# Patient Record
Sex: MALE | Race: WHITE | Employment: OTHER | URBAN - METROPOLITAN AREA
[De-identification: names, ages, dates, MRNs, and addresses within clinical notes are randomized per-mention and may not be internally consistent; named-entity substitution may affect disease eponyms.]

---

## 2017-05-29 PROBLEM — R17 JAUNDICE: Status: ACTIVE | Noted: 2017-05-29

## 2017-05-29 PROBLEM — R74.8 ABNORMAL LIVER ENZYMES: Status: ACTIVE | Noted: 2017-05-29

## 2017-05-29 PROBLEM — F10.10 ALCOHOL ABUSE: Status: ACTIVE | Noted: 2017-05-29

## 2017-06-14 ENCOUNTER — CLINICAL DOCUMENTATION (OUTPATIENT)
Dept: NEPHROLOGY | Age: 27
End: 2017-06-14

## 2017-06-14 PROBLEM — K76.7 HEPATORENAL FAILURE (HCC): Status: ACTIVE | Noted: 2017-06-14

## 2017-08-02 ENCOUNTER — HOSPITAL ENCOUNTER (OUTPATIENT)
Dept: OTHER | Age: 27
Discharge: HOME | End: 2017-08-03
Attending: PHYSICAL MEDICINE & REHABILITATION | Admitting: PHYSICAL MEDICINE & REHABILITATION

## 2017-08-02 ENCOUNTER — HOSPITAL ENCOUNTER (OUTPATIENT)
Dept: PHYSICAL THERAPY | Age: 27
Discharge: HOME | End: 2017-08-31
Admitting: PHYSICAL MEDICINE & REHABILITATION

## 2017-12-18 PROBLEM — F99 PSYCHOLOGICAL DISORDER: Status: ACTIVE | Noted: 2017-12-18

## 2017-12-19 PROBLEM — K70.30 ALCOHOLIC CIRRHOSIS OF LIVER WITHOUT ASCITES (HCC): Chronic | Status: ACTIVE | Noted: 2017-12-19

## 2017-12-19 PROBLEM — F10.10 ALCOHOL ABUSE: Status: RESOLVED | Noted: 2017-05-29 | Resolved: 2017-12-19

## 2017-12-19 PROBLEM — F41.8 OTHER SPECIFIED ANXIETY DISORDERS: Status: ACTIVE | Noted: 2017-12-19

## 2017-12-19 PROBLEM — K76.7 HEPATORENAL FAILURE (HCC): Status: RESOLVED | Noted: 2017-06-14 | Resolved: 2017-12-19

## 2017-12-19 PROBLEM — F10.21 ALCOHOL USE DISORDER, SEVERE, IN EARLY REMISSION, DEPENDENCE (HCC): Status: ACTIVE | Noted: 2017-12-19

## 2017-12-19 PROBLEM — R17 JAUNDICE: Status: RESOLVED | Noted: 2017-05-29 | Resolved: 2017-12-19

## 2017-12-19 PROBLEM — F99 PSYCHOLOGICAL DISORDER: Status: RESOLVED | Noted: 2017-12-18 | Resolved: 2017-12-19

## 2017-12-19 PROBLEM — N30.01 ACUTE CYSTITIS WITH HEMATURIA: Status: ACTIVE | Noted: 2017-12-19

## 2017-12-19 PROBLEM — F33.3 SEVERE EPISODE OF RECURRENT MAJOR DEPRESSIVE DISORDER, WITH PSYCHOTIC FEATURES (HCC): Status: ACTIVE | Noted: 2017-12-19

## 2017-12-19 PROBLEM — R74.8 ABNORMAL LIVER ENZYMES: Status: RESOLVED | Noted: 2017-05-29 | Resolved: 2017-12-19

## 2018-08-22 ENCOUNTER — HOSPITAL ENCOUNTER (EMERGENCY)
Age: 28
Discharge: HOME | End: 2018-08-23
Attending: EMERGENCY MEDICINE
Payer: MEDICARE

## 2018-08-22 DIAGNOSIS — R56.9 SEIZURE (HCC): Primary | ICD-10-CM

## 2018-08-22 LAB
BASE EXCESS VENOUS: -1 (ref -3–3)
CALCIUM IONIZED: 1.15 MMOL/L (ref 1.12–1.32)
CO2: 25 MMOL/L (ref 21–32)
GFR AFRICAN AMERICAN: >60
GFR NON-AFRICAN AMERICAN: >60
GLUCOSE BLD-MCNC: 71 MG/DL (ref 70–99)
HCO3 VENOUS: 24.2 MMOL/L (ref 23–29)
LACTATE: 6.27 MMOL/L (ref 0.4–2)
O2 SAT, VEN: 40 %
PCO2, VEN: 41 MM HG (ref 40–50)
PERFORMED ON: ABNORMAL
PERFORMED ON: ABNORMAL
PH VENOUS: 7.38 (ref 7.35–7.45)
PO2, VEN: 23 MM HG
POC ANION GAP: 11 (ref 10–20)
POC BUN: 18 MG/DL (ref 7–18)
POC CHLORIDE: 107 MMOL/L (ref 99–110)
POC CREATININE: 1 MG/DL (ref 0.9–1.3)
POC POTASSIUM: 5.3 MMOL/L (ref 3.5–5.1)
POC SAMPLE TYPE: ABNORMAL
POC SAMPLE TYPE: ABNORMAL
POC SODIUM: 143 MMOL/L (ref 136–145)
TCO2 CALC VENOUS: 25 MMOL/L

## 2018-08-22 PROCEDURE — 83605 ASSAY OF LACTIC ACID: CPT

## 2018-08-22 PROCEDURE — 80047 BASIC METABLC PNL IONIZED CA: CPT

## 2018-08-22 PROCEDURE — 82803 BLOOD GASES ANY COMBINATION: CPT

## 2018-08-22 PROCEDURE — 99285 EMERGENCY DEPT VISIT HI MDM: CPT

## 2018-08-23 ENCOUNTER — APPOINTMENT (OUTPATIENT)
Dept: CT IMAGING | Age: 28
End: 2018-08-23
Payer: MEDICARE

## 2018-08-23 VITALS
OXYGEN SATURATION: 94 % | TEMPERATURE: 97.8 F | HEART RATE: 65 BPM | DIASTOLIC BLOOD PRESSURE: 98 MMHG | SYSTOLIC BLOOD PRESSURE: 149 MMHG | RESPIRATION RATE: 17 BRPM

## 2018-08-23 PROCEDURE — 93005 ELECTROCARDIOGRAM TRACING: CPT | Performed by: EMERGENCY MEDICINE

## 2018-08-23 PROCEDURE — 70450 CT HEAD/BRAIN W/O DYE: CPT

## 2018-08-23 RX ORDER — ASCORBATE CALCIUM 500 MG
500 TABLET ORAL
COMMUNITY
Start: 2018-04-02 | End: 2018-09-01

## 2018-08-23 RX ORDER — ACETAMINOPHEN 160 MG
1 TABLET,DISINTEGRATING ORAL DAILY
COMMUNITY
Start: 2018-05-01

## 2018-08-23 RX ORDER — FLUOXETINE HYDROCHLORIDE 40 MG/1
40 CAPSULE ORAL
COMMUNITY
Start: 2018-03-27

## 2018-08-23 RX ORDER — NADOLOL 40 MG/1
40 TABLET ORAL DAILY
COMMUNITY
Start: 2018-05-11

## 2018-08-23 ASSESSMENT — ENCOUNTER SYMPTOMS
VOMITING: 0
NAUSEA: 0
SHORTNESS OF BREATH: 0
ABDOMINAL PAIN: 0
DIARRHEA: 0

## 2018-08-23 NOTE — ED TRIAGE NOTES
Pt comes via EMS after a seizure at [address] Knight states he took 9 800mg Gabapentin pills. Pt brother witnessed the seizure.

## 2018-08-23 NOTE — ED PROVIDER NOTES
[address] [facility]          ATTENDING PHYSICIAN NOTE       Date of evaluation: 8/22/2018    Chief Complaint     Seizures      History of Present Illness     Eric Scott is a 25 y.o. male  with a history of Charcot Tarri Blend tooth disease who presents with complaints of a seizure. The patient is chronically on gabapentin for neuropathy pain, taking 800 mg 3 times a day normally. Today he took 9 800 mg tablets around 2 PM.  He began experiencing some twitching not long after and then about 7 hours after taking it developed a generalized tonic-clonic followed by clonic seizure witnessed by family members. The patient was not incontinent but he lost control of his ability to stand up and had to be helped to the ground by a family member. His eyes apparently rolled back into his head and he foamed at the mouth. He has no prior history of seizures. The patient was slow to come around but now has awakened enough to state that he has no complaints at this time. He feels no significant pain other than his baseline neuropathy related pain. He denies any headache. Review of Systems     Review of Systems   Constitutional: Negative for chills and fever. Respiratory: Negative for shortness of breath. Cardiovascular: Negative for chest pain. Gastrointestinal: Negative for abdominal pain, diarrhea, nausea and vomiting. Musculoskeletal: Positive for myalgias. Neurological: Positive for seizures. Negative for weakness, light-headedness and headaches. All other systems reviewed and are negative. Past Medical, Surgical, Family, and Social History     He has a past medical history of Asthma; Charcot-Cheryl-Tooth disease; Liver disease; and Neuropathy. He has a past surgical history that includes Abdomen surgery. His family history is not on file. He reports that he has been smoking Cigarettes. He has a 7.50 pack-year smoking history.  He has never used smokeless tobacco. He reports that he uses drugs, including Marijuana. He reports that he does not drink alcohol. Medications     Discharge Medication List as of 8/23/2018  1:49 AM      CONTINUE these medications which have NOT CHANGED    Details   FLUoxetine (PROZAC) 40 MG capsule Take 40 mg by mouthHistorical Med      Calcium Ascorbate 500 MG TABS Take 500 mg by mouthHistorical Med      !! Cholecalciferol (VITAMIN D3) 2000 units CAPS Take 1 capsule by mouth dailyHistorical Med      nadolol (CORGARD) 40 MG tablet Take 40 mg by mouth dailyHistorical Med      gabapentin (NEURONTIN) 400 MG capsule Take 2 capsules by mouth 3 times dailyHistorical Med      albuterol sulfate  (90 Base) MCG/ACT inhaler Inhale 2 puffs into the lungs every 4 hours as needed for Wheezing, Disp-1 Inhaler, R-3Print      omeprazole (PRILOSEC) 40 MG delayed release capsule Take 1 capsule by mouth daily, Disp-30 capsule, R-3Print      rifaximin (XIFAXAN) 550 MG tablet Take 1 tablet by mouth 2 times daily, Disp-60 tablet, R-1Print      !! vitamin D (CHOLECALCIFEROL) 5000 units CAPS capsule Take 1 capsule by mouth daily, Disp-30 capsule, R-0Print       !! - Potential duplicate medications found. Please discuss with provider. Allergies     He has No Known Allergies. Physical Exam     INITIAL VITALS: BP: (!) 149/98, Temp: 97.8 °F (36.6 °C), Pulse: 69, Resp: 20, SpO2: 99 %   Physical Exam   Constitutional: He is oriented to person, place, and time. He appears well-developed and well-nourished. No distress. HENT:   Head: Normocephalic and atraumatic. Eyes: Pupils are equal, round, and reactive to light. EOM are normal.   Neck: Normal range of motion. Neck supple. Cardiovascular: Normal rate and regular rhythm. Pulmonary/Chest: Effort normal and breath sounds normal.   Abdominal: Soft. He exhibits no distension. Neurological: He is alert and oriented to person, place, and time. No cranial nerve deficit or sensory deficit.  He displays no seizure activity. Coordination normal.   Skin: Skin is warm and dry. He is not diaphoretic. Psychiatric: He has a normal mood and affect. His behavior is normal.   Nursing note and vitals reviewed. Diagnostic Results     EKG   Interpreted by No att. providers found     Rhythm: normal sinus   Rate: normal  Axis: normal  Ectopy: none  Conduction: normal  ST Segments: no acute change  T Waves: no acute change  Q Waves: none    Clinical Impression: normal sinus rhythm with no acute changes/normal EKG      RADIOLOGY:  CT Head WO Contrast   Final Result     No acute intracranial abnormality. LABS:   Results for orders placed or performed during the hospital encounter of 08/22/18   POCT Venous   Result Value Ref Range    pH, Shaggy 7.379 7.350 - 7.450    pCO2, Shaggy 41.0 40.0 - 50.0 mm Hg    pO2, Shaggy 23 Not Established mm Hg    HCO3, Venous 24.2 23.0 - 29.0 mmol/L    Base Excess, Shaggy -1 -3 - 3    O2 Sat, Shaggy 40 Not Established %    TC02 (Calc), Shaggy 25 Not Established mmol/L    Lactate 6.27 (HH) 0.40 - 2.00 mmol/L    Sample Type SHAGGY     Performed on SEE BELOW    POCT Venous   Result Value Ref Range    POC Sodium 143 136 - 145 mmol/L    POC Potassium 5.3 (H) 3.5 - 5.1 mmol/L    POC Chloride 107 99 - 110 mmol/L    CO2 25 21 - 32 mmol/L    POC Anion Gap 11 10 - 20    POC Glucose 71 70 - 99 mg/dl    POC BUN 18 7 - 18 mg/dL    POC Creatinine 1.0 0.9 - 1.3 mg/dL    GFR Non-African American >60 >60    GFR African American >60     Calcium, Ion 1.15 1.12 - 1.32 mmol/L    Sample Type SHAGGY     Performed on SEE BELOW      RECENT VITALS:  BP: (!) 149/98, Temp: 97.8 °F (36.6 °C), Pulse: 65, Resp: 17, SpO2: 94 %       ED Course     Nursing Notes, Past Medical Hx, Past Surgical Hx, Social Hx, Allergies, and Family Hx were reviewed. The patient was given the following medications:  No orders of the defined types were placed in this encounter.       CONSULTS:  None    MEDICAL DECISION MAKING / ASSESSMENT / PLAN     Shavonne Bangura

## 2018-09-01 ENCOUNTER — HOSPITAL ENCOUNTER (EMERGENCY)
Age: 28
Discharge: HOME | End: 2018-09-01
Attending: EMERGENCY MEDICINE
Payer: MEDICARE

## 2018-09-01 VITALS
OXYGEN SATURATION: 94 % | HEART RATE: 65 BPM | TEMPERATURE: 99.5 F | HEIGHT: 74 IN | SYSTOLIC BLOOD PRESSURE: 113 MMHG | DIASTOLIC BLOOD PRESSURE: 76 MMHG | WEIGHT: 165 LBS | RESPIRATION RATE: 17 BRPM | BODY MASS INDEX: 21.17 KG/M2

## 2018-09-01 DIAGNOSIS — T50.902A INTENTIONAL DRUG OVERDOSE, INITIAL ENCOUNTER (HCC): Primary | ICD-10-CM

## 2018-09-01 LAB
ACETAMINOPHEN LEVEL: <5 UG/ML (ref 10–30)
CALCIUM IONIZED: 1.19 MMOL/L (ref 1.12–1.32)
CO2: 25 MMOL/L (ref 21–32)
ETHANOL: NORMAL MG/DL (ref 0–0.08)
GFR AFRICAN AMERICAN: >60
GFR NON-AFRICAN AMERICAN: >60
GLUCOSE BLD-MCNC: 96 MG/DL (ref 70–99)
PERFORMED ON: NORMAL
POC ANION GAP: 12 (ref 10–20)
POC BUN: 17 MG/DL (ref 7–18)
POC CHLORIDE: 107 MMOL/L (ref 99–110)
POC CREATININE: 0.9 MG/DL (ref 0.9–1.3)
POC POTASSIUM: 4.1 MMOL/L (ref 3.5–5.1)
POC SAMPLE TYPE: NORMAL
POC SODIUM: 144 MMOL/L (ref 136–145)
SALICYLATE, SERUM: <0.3 MG/DL (ref 15–30)

## 2018-09-01 PROCEDURE — G0480 DRUG TEST DEF 1-7 CLASSES: HCPCS

## 2018-09-01 PROCEDURE — 80047 BASIC METABLC PNL IONIZED CA: CPT

## 2018-09-01 PROCEDURE — 99284 EMERGENCY DEPT VISIT MOD MDM: CPT

## 2018-09-01 PROCEDURE — 93005 ELECTROCARDIOGRAM TRACING: CPT | Performed by: PHYSICIAN ASSISTANT

## 2018-09-01 ASSESSMENT — ENCOUNTER SYMPTOMS
COLOR CHANGE: 0
SHORTNESS OF BREATH: 0
NAUSEA: 0
ABDOMINAL PAIN: 0
BACK PAIN: 0
VOMITING: 0

## 2018-09-01 NOTE — ED PROVIDER NOTES
[facility]          PHYSICIAN ASSISTANT NOTE       Date of evaluation: 9/1/2018    Chief Complaint     Drug Overdose (took extra gabapentin to get high)      History of Present Illness     Gloria Duncan is a 25 y.o. male who presents with intention overdose reporting he took 9 Gabapentin 800 mg in an attempt to get high. Denies suicidal ideations or attempt at self harm. Denies any other ingestions. Patient came to the emergency department today because he reports he did this earlier in the week and had a seizure after ingestion and now is concerned that he may have a seizure. He reports that he has intermittent jerks in his arms and legs although did not have any of the swelling was speaking with him. He denies nausea or vomiting, chest pain, shortness of breath, headache, confusion    Review of Systems     Review of Systems   Constitutional: Negative for chills and fever. Respiratory: Negative for shortness of breath. Cardiovascular: Negative for chest pain. Gastrointestinal: Negative for abdominal pain, nausea and vomiting. Musculoskeletal: Negative for back pain and myalgias. Skin: Negative for color change and wound. Neurological: Negative for weakness, light-headedness, numbness and headaches. Psychiatric/Behavioral: Negative for confusion, self-injury and suicidal ideas. All other systems reviewed and are negative. Past Medical, Surgical, Family, and Social History     He has a past medical history of Asthma; Charcot-Cheryl-Tooth disease; Liver disease; and Neuropathy. He has a past surgical history that includes Abdomen surgery. His family history is not on file. He reports that he has been smoking Cigarettes. He has a 7.50 pack-year smoking history. He has never used smokeless tobacco. He reports that he uses drugs, including Marijuana. He reports that he does not drink alcohol.     Medications     Previous Medications    ALBUTEROL SULFATE  (90 BASE) MCG/ACT INHALER    Inhale 2 puffs into the lungs every 4 hours as needed for Wheezing    CHOLECALCIFEROL (VITAMIN D3) 2000 UNITS CAPS    Take 1 capsule by mouth daily    FLUOXETINE (PROZAC) 40 MG CAPSULE    Take 40 mg by mouth    GABAPENTIN (NEURONTIN) 400 MG CAPSULE    Take 2 capsules by mouth 3 times daily    NADOLOL (CORGARD) 40 MG TABLET    Take 40 mg by mouth daily    OMEPRAZOLE (PRILOSEC) 40 MG DELAYED RELEASE CAPSULE    Take 1 capsule by mouth daily    RIFAXIMIN (XIFAXAN) 550 MG TABLET    Take 1 tablet by mouth 2 times daily    VITAMIN D (CHOLECALCIFEROL) 5000 UNITS CAPS CAPSULE    Take 1 capsule by mouth daily       Allergies     He has No Known Allergies. Physical Exam     INITIAL VITALS: BP: (!) 135/92, Temp: 99.5 °F (37.5 °C), Pulse: 66, Resp: 20, SpO2: 94 %   Physical Exam   Constitutional: He is oriented to person, place, and time. He appears well-developed and well-nourished. No distress. HENT:   Head: Normocephalic and atraumatic. Eyes: Pupils are equal, round, and reactive to light. Conjunctivae are normal.   Neck: Normal range of motion. Cardiovascular: Normal rate, regular rhythm, normal heart sounds and intact distal pulses. Pulmonary/Chest: Effort normal and breath sounds normal. No respiratory distress. Abdominal: Soft. There is no tenderness. Musculoskeletal: Normal range of motion. Neurological: He is alert and oriented to person, place, and time. Skin: Skin is warm and dry. He is not diaphoretic. Psychiatric: He has a normal mood and affect. His behavior is normal. Judgment and thought content normal.   Nursing note and vitals reviewed.       Diagnostic Results     EKG   Interpreted in conjunction with emergency department physician Danny Burks MD  Rhythm: sinus bradycardia  Rate: 50-60  Axis: normal  Ectopy: none  Conduction: normal  ST Segments: normal  T Waves: no acute change  Q Waves: none  Clinical Impression: no acute changes and non-specific EKG  Comparison:  Aug 23 2018    RADIOLOGY:  No orders to display       LABS:   Results for orders placed or performed during the hospital encounter of 09/01/18   Ethanol   Result Value Ref Range    Ethanol Lvl None Detected mg/dL   Salicylate   Result Value Ref Range    Salicylate, Serum <3.4 (L) 15.0 - 30.0 mg/dL   Acetaminophen level   Result Value Ref Range    Acetaminophen Level <5 (L) 10 - 30 ug/mL   POCT Venous   Result Value Ref Range    POC Sodium 144 136 - 145 mmol/L    POC Potassium 4.1 3.5 - 5.1 mmol/L    POC Chloride 107 99 - 110 mmol/L    CO2 25 21 - 32 mmol/L    POC Anion Gap 12 10 - 20    POC Glucose 96 70 - 99 mg/dl    POC BUN 17 7 - 18 mg/dL    POC Creatinine 0.9 0.9 - 1.3 mg/dL    GFR Non-African American >60 >60    GFR African American >60     Calcium, Ion 1.19 1.12 - 1.32 mmol/L    Sample Type SHAGGY     Performed on SEE BELOW        ED BEDSIDE ULTRASOUND:      RECENT VITALS:  BP: 113/76, Temp: 99.5 °F (37.5 °C), Pulse: 65, Resp: 17, SpO2: 94 %     Procedures         ED Course     Nursing Notes, Past Medical Hx, Past Surgical Hx, Social Hx, Allergies, and Family Hx were reviewed. The patient was given the following medications:  No orders of the defined types were placed in this encounter. CONSULTS:  None    MEDICAL DECISION MAKING / ASSESSMENT / PLAN     Oswald Ames is a 25 y.o. male who presents emergency Department with complaint of an intentional overdose in an attempt to get high. He denies any suicidal ideations or attempts at self-harm. Patient states he has done this in the past and earlier this week had a seizure related to his gabapentin misuse and so he came to the emergency department today after he ingested additional tablets again. Patient reports his ingestion occurred at 2100. He spoke with poison control who reports peak in gabapentin is 2-4 hours and recommended monitoring the patient for 6 hours from time of ingestion.     Patient did report some mild

## 2018-09-01 NOTE — ED PROVIDER NOTES
ED Attending Attestation Note     Date of evaluation: 9/1/2018    This patient was seen by the advance practice provider. I have seen and examined the patient, agree with the workup, evaluation, management and diagnosis. The care plan has been discussed. I have reviewed the ECG and concur with the FELICITAS's interpretation. My assessment reveals a 25year old male with a PMHx of Charcot Lg Najjar tooth disease cho presented after an unintentional overdose of Gabapentin. He was seen in the ED on 8/22 for the same. He reports taking nine, 800mg tablets in an attempt to \"catch a buz\". He denies any suicidal ideation or intention to harm himself. His is awake and alert x3. Clear lungs. RRR. Abdomen soft. Will touch base with poison control and check screening labs.          Michelle Davis MD  09/01/18 [id number]

## 2018-09-11 LAB
EKG ATRIAL RATE: 74 BPM
EKG DIAGNOSIS: NORMAL
EKG P AXIS: 72 DEGREES
EKG P-R INTERVAL: 136 MS
EKG Q-T INTERVAL: 396 MS
EKG QRS DURATION: 102 MS
EKG QTC CALCULATION (BAZETT): 439 MS
EKG R AXIS: 77 DEGREES
EKG T AXIS: 70 DEGREES
EKG VENTRICULAR RATE: 74 BPM

## 2018-09-13 LAB
EKG ATRIAL RATE: 50 BPM
EKG DIAGNOSIS: NORMAL
EKG P AXIS: 59 DEGREES
EKG P-R INTERVAL: 138 MS
EKG Q-T INTERVAL: 448 MS
EKG QRS DURATION: 102 MS
EKG QTC CALCULATION (BAZETT): 408 MS
EKG R AXIS: 66 DEGREES
EKG T AXIS: 60 DEGREES
EKG VENTRICULAR RATE: 50 BPM

## 2019-08-08 ENCOUNTER — HOSPITAL ENCOUNTER (EMERGENCY)
Age: 29
Discharge: HOME | End: 2019-08-08
Attending: EMERGENCY MEDICINE
Payer: MEDICARE

## 2019-08-08 VITALS
WEIGHT: 160 LBS | HEIGHT: 74 IN | HEART RATE: 71 BPM | BODY MASS INDEX: 20.53 KG/M2 | TEMPERATURE: 98.5 F | SYSTOLIC BLOOD PRESSURE: 105 MMHG | RESPIRATION RATE: 16 BRPM | DIASTOLIC BLOOD PRESSURE: 63 MMHG | OXYGEN SATURATION: 97 %

## 2019-08-08 DIAGNOSIS — R56.9 SEIZURE (HCC): Primary | ICD-10-CM

## 2019-08-08 LAB
ALBUMIN SERPL-MCNC: 4.1 G/DL (ref 3.4–5)
ALP BLD-CCNC: 119 U/L (ref 40–129)
ALT SERPL-CCNC: 32 U/L (ref 10–40)
ANION GAP SERPL CALCULATED.3IONS-SCNC: 13 MMOL/L (ref 3–16)
AST SERPL-CCNC: 29 U/L (ref 15–37)
BASE EXCESS VENOUS: -5 (ref -3–3)
BASOPHILS ABSOLUTE: 0 K/UL (ref 0–0.2)
BASOPHILS RELATIVE PERCENT: 0.5 %
BILIRUB SERPL-MCNC: 0.5 MG/DL (ref 0–1)
BILIRUBIN DIRECT: <0.2 MG/DL (ref 0–0.3)
BILIRUBIN, INDIRECT: NORMAL MG/DL (ref 0–1)
BUN BLDV-MCNC: 14 MG/DL (ref 7–20)
CALCIUM SERPL-MCNC: 9.3 MG/DL (ref 8.3–10.6)
CHLORIDE BLD-SCNC: 106 MMOL/L (ref 99–110)
CO2: 22 MMOL/L (ref 21–32)
CREAT SERPL-MCNC: 0.8 MG/DL (ref 0.9–1.3)
EOSINOPHILS ABSOLUTE: 0.2 K/UL (ref 0–0.6)
EOSINOPHILS RELATIVE PERCENT: 3 %
GFR AFRICAN AMERICAN: >60
GFR NON-AFRICAN AMERICAN: >60
GLUCOSE BLD-MCNC: 128 MG/DL (ref 70–99)
HCO3 VENOUS: 19.4 MMOL/L (ref 23–29)
HCT VFR BLD CALC: 43.3 % (ref 40.5–52.5)
HEMOGLOBIN: 14.2 G/DL (ref 13.5–17.5)
INR BLD: 1.13 (ref 0.86–1.14)
LACTATE: 2.93 MMOL/L (ref 0.4–2)
LYMPHOCYTES ABSOLUTE: 2.4 K/UL (ref 1–5.1)
LYMPHOCYTES RELATIVE PERCENT: 40.1 %
MCH RBC QN AUTO: 29.1 PG (ref 26–34)
MCHC RBC AUTO-ENTMCNC: 32.8 G/DL (ref 31–36)
MCV RBC AUTO: 88.9 FL (ref 80–100)
MONOCYTES ABSOLUTE: 0.3 K/UL (ref 0–1.3)
MONOCYTES RELATIVE PERCENT: 5.3 %
NEUTROPHILS ABSOLUTE: 3.1 K/UL (ref 1.7–7.7)
NEUTROPHILS RELATIVE PERCENT: 51.1 %
O2 SAT, VEN: 95 %
PCO2, VEN: 30.4 MM HG (ref 40–50)
PDW BLD-RTO: 13.4 % (ref 12.4–15.4)
PERFORMED ON: ABNORMAL
PH VENOUS: 7.41 (ref 7.35–7.45)
PLATELET # BLD: 121 K/UL (ref 135–450)
PMV BLD AUTO: 8.6 FL (ref 5–10.5)
PO2, VEN: 75 MM HG
POC SAMPLE TYPE: ABNORMAL
POTASSIUM REFLEX MAGNESIUM: 3.9 MMOL/L (ref 3.5–5.1)
PROTHROMBIN TIME: 12.9 SEC (ref 9.8–13)
RBC # BLD: 4.87 M/UL (ref 4.2–5.9)
SODIUM BLD-SCNC: 141 MMOL/L (ref 136–145)
TCO2 CALC VENOUS: 20 MMOL/L
TOTAL PROTEIN: 7.3 G/DL (ref 6.4–8.2)
WBC # BLD: 6.1 K/UL (ref 4–11)

## 2019-08-08 PROCEDURE — 80048 BASIC METABOLIC PNL TOTAL CA: CPT

## 2019-08-08 PROCEDURE — 82803 BLOOD GASES ANY COMBINATION: CPT

## 2019-08-08 PROCEDURE — 80076 HEPATIC FUNCTION PANEL: CPT

## 2019-08-08 PROCEDURE — 85610 PROTHROMBIN TIME: CPT

## 2019-08-08 PROCEDURE — 99284 EMERGENCY DEPT VISIT MOD MDM: CPT

## 2019-08-08 PROCEDURE — 85025 COMPLETE CBC W/AUTO DIFF WBC: CPT

## 2019-08-08 PROCEDURE — 83605 ASSAY OF LACTIC ACID: CPT

## 2019-08-08 ASSESSMENT — ENCOUNTER SYMPTOMS
SHORTNESS OF BREATH: 0
BACK PAIN: 0
VOMITING: 0
NAUSEA: 0
CHEST TIGHTNESS: 0
ABDOMINAL PAIN: 0

## 2019-08-09 NOTE — ED PROVIDER NOTES
ED Attending Attestation Note     Date of evaluation: 8/8/2019    This patient was seen by the advance practice provider. I have seen and examined the patient, agree with the workup, evaluation, management and diagnosis. The care plan has been discussed. My assessment reveals patient here for a seizure. He has a history of Charcot Ailyn Client tooth disease and he states he took too many of his Lyrica and then he had a seizure. He denies any injuries with a seizure. Jose Larios He states this happened 5 to 6 months ago when he took too many of his gabapentin. When asked why he took too many Lyrica he stated he just wanted to get high. Patient denies any suicidal ideation. Patient is awake alert now. He had no tongue biting no bowel or bladder dysfunction. On exam there is no intraoral or tongue lesions and patient awake alert oriented x3 no focal deficits noted and speech clear. . .      Cinthia Ontiveros MD  08/08/19 [id number]
Pulmonary/Chest: Effort normal and breath sounds normal. No respiratory distress. Abdominal: Soft. There is no tenderness. Musculoskeletal: Normal range of motion. He exhibits no edema. Neurological: He is alert. AOx4, able to follow commands; normal speech with no aphasia and no dysarthria; no facial asymmetry; Visual fields intact bilaterally, EOMI bilaterally; no pronator drift of the upper extremities bilaterally; he has 5/5  strength bilaterally and can push and pull with full strength and no asymmetry. No motor drift of the lower extremities bilaterally. Normal finger-to-nose bilaterally, Normal sensation to soft touch in the upper and lower extremities bilaterally. Skin: He is not diaphoretic. Psychiatric: He has a normal mood and affect.  His behavior is normal.       Diagnostic Results     RADIOLOGY:  No orders to display       LABS:   Results for orders placed or performed during the hospital encounter of 08/08/19   CBC Auto Differential   Result Value Ref Range    WBC 6.1 4.0 - 11.0 K/uL    RBC 4.87 4.20 - 5.90 M/uL    Hemoglobin 14.2 13.5 - 17.5 g/dL    Hematocrit 43.3 40.5 - 52.5 %    MCV 88.9 80.0 - 100.0 fL    MCH 29.1 26.0 - 34.0 pg    MCHC 32.8 31.0 - 36.0 g/dL    RDW 13.4 12.4 - 15.4 %    Platelets 382 (L) 910 - 450 K/uL    MPV 8.6 5.0 - 10.5 fL    Neutrophils % 51.1 %    Lymphocytes % 40.1 %    Monocytes % 5.3 %    Eosinophils % 3.0 %    Basophils % 0.5 %    Neutrophils # 3.1 1.7 - 7.7 K/uL    Lymphocytes # 2.4 1.0 - 5.1 K/uL    Monocytes # 0.3 0.0 - 1.3 K/uL    Eosinophils # 0.2 0.0 - 0.6 K/uL    Basophils # 0.0 0.0 - 0.2 K/uL   Basic Metabolic Panel w/ Reflex to MG   Result Value Ref Range    Sodium 141 136 - 145 mmol/L    Potassium reflex Magnesium 3.9 3.5 - 5.1 mmol/L    Chloride 106 99 - 110 mmol/L    CO2 22 21 - 32 mmol/L    Anion Gap 13 3 - 16    Glucose 128 (H) 70 - 99 mg/dL    BUN 14 7 - 20 mg/dL    CREATININE 0.8 (L) 0.9 - 1.3 mg/dL    GFR Non-African American >60 >60